# Patient Record
(demographics unavailable — no encounter records)

---

## 2024-11-15 NOTE — CONSULT LETTER
[Dear  ___] : Dear  [unfilled], [Consult Letter:] : I had the pleasure of evaluating your patient, [unfilled]. [Please see my note below.] : Please see my note below. [Consult Closing:] : Thank you very much for allowing me to participate in the care of this patient.  If you have any questions, please do not hesitate to contact me. [Sincerely,] : Sincerely, [FreeTextEntry2] : Dr. Joe Schulz

## 2024-11-15 NOTE — END OF VISIT
[FreeTextEntry3] : ELIANE Mckeon has acted like a scribe on my behalf. I have reviewed the note and edited where appropriate. History, PE, assessment, and plan were personally performed by me.

## 2024-11-15 NOTE — HISTORY OF PRESENT ILLNESS
[de-identified] : This is a 3-year-old male with RAD and chronic rhinitis presenting for a follow up.  Patient was last seen March 2024. Had a flare up May 2024 - took oral steroids x 5 days as patient was still wheezing despite on albuterol every 6 hours. Prescribed by pediatrician.  Patient was on fluticasone 110 2 puffs BID weanted off for the summer. Patient was ok over the summer. Also needed albuterol in October when he had an URI. No ER visits or hospitalizations. No nighttime awakenings. No exertional symptoms. 1 course of oral steroids.  No eczema. No food or medication allergies. No pets at home - around dog at .

## 2024-11-15 NOTE — HISTORY OF PRESENT ILLNESS
[de-identified] : This is a 3-year-old male with RAD and chronic rhinitis presenting for a follow up.  Patient was last seen March 2024. Had a flare up May 2024 - took oral steroids x 5 days as patient was still wheezing despite on albuterol every 6 hours. Prescribed by pediatrician.  Patient was on fluticasone 110 2 puffs BID weanted off for the summer. Patient was ok over the summer. Also needed albuterol in October when he had an URI. No ER visits or hospitalizations. No nighttime awakenings. No exertional symptoms. 1 course of oral steroids.  No eczema. No food or medication allergies. No pets at home - around dog at .